# Patient Record
Sex: FEMALE | Race: WHITE | NOT HISPANIC OR LATINO | Employment: FULL TIME | URBAN - METROPOLITAN AREA
[De-identification: names, ages, dates, MRNs, and addresses within clinical notes are randomized per-mention and may not be internally consistent; named-entity substitution may affect disease eponyms.]

---

## 2019-09-30 ENCOUNTER — OFFICE VISIT (OUTPATIENT)
Dept: ORTHOPEDIC SURGERY | Facility: CLINIC | Age: 54
End: 2019-09-30

## 2019-09-30 VITALS — HEIGHT: 66 IN | BODY MASS INDEX: 34.55 KG/M2 | TEMPERATURE: 97.4 F | WEIGHT: 215 LBS

## 2019-09-30 DIAGNOSIS — R52 PAIN: ICD-10-CM

## 2019-09-30 DIAGNOSIS — M65.28 CALCIFIC ACHILLES TENDONITIS: Primary | ICD-10-CM

## 2019-09-30 DIAGNOSIS — M19.079 ARTHRITIS OF ANKLE: ICD-10-CM

## 2019-09-30 DIAGNOSIS — M92.61 HAGLUND'S DEFORMITY OF RIGHT HEEL: ICD-10-CM

## 2019-09-30 PROCEDURE — 99204 OFFICE O/P NEW MOD 45 MIN: CPT | Performed by: ORTHOPAEDIC SURGERY

## 2019-09-30 PROCEDURE — 73650 X-RAY EXAM OF HEEL: CPT | Performed by: ORTHOPAEDIC SURGERY

## 2019-09-30 RX ORDER — AMITRIPTYLINE HYDROCHLORIDE 50 MG/1
50 TABLET, FILM COATED ORAL DAILY
COMMUNITY
Start: 2019-01-30 | End: 2020-01-30

## 2019-09-30 RX ORDER — IRBESARTAN AND HYDROCHLOROTHIAZIDE 150; 12.5 MG/1; MG/1
1 TABLET, FILM COATED ORAL DAILY
COMMUNITY
Start: 2018-12-31

## 2019-09-30 NOTE — PROGRESS NOTES
New Patient Complaint      Patient: Lakeisha An  YOB: 1965 53 y.o. female  Medical Record Number: [id number]    Chief Complaints: My heel hurts    History of Present Illness:   Patient reports a 5 to 6-month history of moderate constant stabbing pain in the posterior portion of the plantar aspect of the right hindfoot began after she slipped down some steps at home.  Pain is worse on start up in the morning and after standing from prolonged seated position but never completely resolved.    Prior to that she had a history of bilateral ankle fractures in the past but no surgery has no specific complaints about the right ankle today but does complain of the ankle swells during the day and improved some with elevation overnight.  She has a tall boot from a previous injury but has not been using it.       HPI    Allergies:   Allergies   Allergen Reactions   • Sulfa Antibiotics Nausea And Vomiting       Medications:   Current Outpatient Medications on File Prior to Visit   Medication Sig   • amitriptyline (ELAVIL) 50 MG tablet Take 50 mg by mouth Daily.   • irbesartan-hydrochlorothiazide (AVALIDE) 150-12.5 MG tablet Take 1 tablet by mouth Daily.     No current facility-administered medications on file prior to visit.        Past Medical History:   Diagnosis Date   • Hypertension      Past Surgical History:   Procedure Laterality Date   • HYSTERECTOMY     • SINUS SURGERY       Social History     Occupational History   • Not on file   Tobacco Use   • Smoking status: Never Smoker   • Smokeless tobacco: Never Used   Substance and Sexual Activity   • Alcohol use: No     Frequency: Never   • Drug use: No   • Sexual activity: Not on file      Social History     Social History Narrative   • Not on file     History reviewed. No pertinent family history.    Review of Systems: 14 point review of systems performed, positive pertinent findings identified in HPI. All remaining systems negative except congestion,  "sinus pain, sinus pressure, sore throat    Review of Systems      Physical Exam:   Vitals:    09/30/19 0946   Temp: 97.4 °F (36.3 °C)   TempSrc: Temporal   Weight: 97.5 kg (215 lb)   Height: 167.6 cm (66\")   PainSc:   6   PainLoc: Foot     Physical Exam   Constitutional: pleasant, well developed   Eyes: sclera non icteric  Hearing : adequate for exam  Cardiovascular: palpable pulses in right foot, right calf/ thigh NT without sign of DVT  Respiratoy: breathing unlabored   Neurological: grossly sensate to LT throughout right LE  Psychiatric: oriented with normal mood and affect.   Lymphatic: No palpable popliteal lymphadenopathy right LE  Skin: intact throughout right leg/foot  Musculoskeletal: Right ankle showed no focal tenderness of the medial or lateral aspects of the ankle there was moderate tenderness palpation at the insertion of the Achilles and some pain with medial lateral calcaneal traction as well as at the insertion of the plantar fascia.  Neutral dorsiflexion of the heel cord.  There is no focal pain with tibiotalar range of motion  Physical Exam  Ortho Exam    Radiology: 2 views right calcaneus ordered evaluate pain reviewed and no prior x-rays available for comparison there is spurring at the insertion of the Achilles more so than plantarly no obvious fracture.  There does appear to be some mild arthritic change of the tibiotalar joint with some spurring posteriorly and anteriorly.  There is some questionable lucent area of the distal aspect of the tibia on the lateral view    Assessment/Plan: 1.  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  Asymptomatic right ankle arthritis status post nonoperative treatment for ankle fracture elsewhere    We reviewed treatment options and she can get back into the tall boot that she has and limit activity.  She declined use of a cane.    We had her fitted with a " night splint discussed etiology of its use and demonstrated gentle heel cord stretching exercises to do at least 3 or 4 times a day and she was fitted with compression hose for daytime use.    We will get an MRI of her right hindfoot to make sure there is no calcaneal stress fracture or interstitial tear of the Achilles is not that we can proceed with more aggressive therapy type exercises.    She understands to call to schedule follow-up appointment after MRI has been scheduled in order to review results in the office.    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

## 2019-10-03 ENCOUNTER — TELEPHONE (OUTPATIENT)
Dept: ORTHOPEDIC SURGERY | Facility: CLINIC | Age: 54
End: 2019-10-03

## 2019-10-03 NOTE — TELEPHONE ENCOUNTER
I spoke with patient and she evidently does not want to have the MRI as her insurance will not pay for it.    She not really been using the tall boot as she says is difficult to work in but has been using a night splint.    Reviewed with her that the main reason for the MRI which make sure is no stress fracture and if symptoms are improving we could hold off on it for now she will monitor this and let me know we will mail her some gentle stretching exercises to do and set up to see me back in a month but if anything worsens in the interim she will let me know and we will go ahead and see about getting the MRI.

## 2019-10-11 ENCOUNTER — TELEPHONE (OUTPATIENT)
Dept: ORTHOPEDIC SURGERY | Facility: CLINIC | Age: 54
End: 2019-10-11

## 2019-10-11 NOTE — TELEPHONE ENCOUNTER
Patient returned call regarding schdeuling an appt. In 1 mo with MWAIDEN. Patient says that she is doing a little better since she's wearing the boot at night and the stockings during the day also doing the exercises that were given. Patient will call back if problem gets worse.

## 2021-06-07 ENCOUNTER — TRANSCRIBE ORDERS (OUTPATIENT)
Dept: ADMINISTRATIVE | Facility: HOSPITAL | Age: 56
End: 2021-06-07

## 2021-06-07 ENCOUNTER — LAB (OUTPATIENT)
Dept: LAB | Facility: HOSPITAL | Age: 56
End: 2021-06-07

## 2021-06-07 DIAGNOSIS — R05.9 COUGH: ICD-10-CM

## 2021-06-07 DIAGNOSIS — J98.8 CONGESTION OF RESPIRATORY TRACT: ICD-10-CM

## 2021-06-07 DIAGNOSIS — R06.2 WHEEZING: ICD-10-CM

## 2021-06-07 DIAGNOSIS — R06.2 WHEEZING: Primary | ICD-10-CM

## 2021-06-07 LAB
B PARAPERT DNA SPEC QL NAA+PROBE: NOT DETECTED
B PERT DNA SPEC QL NAA+PROBE: NOT DETECTED
BASOPHILS # BLD AUTO: 0.02 10*3/MM3 (ref 0–0.2)
BASOPHILS NFR BLD AUTO: 0.2 % (ref 0–1.5)
C PNEUM DNA NPH QL NAA+NON-PROBE: NOT DETECTED
DEPRECATED RDW RBC AUTO: 42.5 FL (ref 37–54)
EOSINOPHIL # BLD AUTO: 0.21 10*3/MM3 (ref 0–0.4)
EOSINOPHIL NFR BLD AUTO: 2.5 % (ref 0.3–6.2)
ERYTHROCYTE [DISTWIDTH] IN BLOOD BY AUTOMATED COUNT: 13.2 % (ref 12.3–15.4)
FLUAV SUBTYP SPEC NAA+PROBE: NOT DETECTED
FLUBV RNA ISLT QL NAA+PROBE: NOT DETECTED
HADV DNA SPEC NAA+PROBE: NOT DETECTED
HCOV 229E RNA SPEC QL NAA+PROBE: NOT DETECTED
HCOV HKU1 RNA SPEC QL NAA+PROBE: NOT DETECTED
HCOV NL63 RNA SPEC QL NAA+PROBE: NOT DETECTED
HCOV OC43 RNA SPEC QL NAA+PROBE: NOT DETECTED
HCT VFR BLD AUTO: 42.7 % (ref 34–46.6)
HGB BLD-MCNC: 14.1 G/DL (ref 12–15.9)
HMPV RNA NPH QL NAA+NON-PROBE: NOT DETECTED
HPIV1 RNA SPEC QL NAA+PROBE: NOT DETECTED
HPIV2 RNA SPEC QL NAA+PROBE: NOT DETECTED
HPIV3 RNA NPH QL NAA+PROBE: NOT DETECTED
HPIV4 P GENE NPH QL NAA+PROBE: NOT DETECTED
IMM GRANULOCYTES # BLD AUTO: 0.03 10*3/MM3 (ref 0–0.05)
IMM GRANULOCYTES NFR BLD AUTO: 0.4 % (ref 0–0.5)
LYMPHOCYTES # BLD AUTO: 1.98 10*3/MM3 (ref 0.7–3.1)
LYMPHOCYTES NFR BLD AUTO: 23.7 % (ref 19.6–45.3)
M PNEUMO IGG SER IA-ACNC: NOT DETECTED
MCH RBC QN AUTO: 29 PG (ref 26.6–33)
MCHC RBC AUTO-ENTMCNC: 33 G/DL (ref 31.5–35.7)
MCV RBC AUTO: 87.7 FL (ref 79–97)
MONOCYTES # BLD AUTO: 0.68 10*3/MM3 (ref 0.1–0.9)
MONOCYTES NFR BLD AUTO: 8.1 % (ref 5–12)
NEUTROPHILS NFR BLD AUTO: 5.45 10*3/MM3 (ref 1.7–7)
NEUTROPHILS NFR BLD AUTO: 65.1 % (ref 42.7–76)
NRBC BLD AUTO-RTO: 0 /100 WBC (ref 0–0.2)
PLATELET # BLD AUTO: 216 10*3/MM3 (ref 140–450)
PMV BLD AUTO: 12.2 FL (ref 6–12)
PROCALCITONIN SERPL-MCNC: 0.05 NG/ML (ref 0–0.25)
RBC # BLD AUTO: 4.87 10*6/MM3 (ref 3.77–5.28)
RHINOVIRUS RNA SPEC NAA+PROBE: DETECTED
RSV RNA NPH QL NAA+NON-PROBE: NOT DETECTED
SARS-COV-2 RNA NPH QL NAA+NON-PROBE: NOT DETECTED
WBC # BLD AUTO: 8.37 10*3/MM3 (ref 3.4–10.8)

## 2021-06-07 PROCEDURE — 0202U NFCT DS 22 TRGT SARS-COV-2: CPT

## 2021-06-07 PROCEDURE — 85025 COMPLETE CBC W/AUTO DIFF WBC: CPT

## 2021-06-07 PROCEDURE — 84145 PROCALCITONIN (PCT): CPT

## 2021-06-07 PROCEDURE — 36415 COLL VENOUS BLD VENIPUNCTURE: CPT

## 2021-06-07 PROCEDURE — C9803 HOPD COVID-19 SPEC COLLECT: HCPCS

## 2021-09-04 ENCOUNTER — IMMUNIZATION (OUTPATIENT)
Dept: VACCINE CLINIC | Facility: HOSPITAL | Age: 56
End: 2021-09-04

## 2021-09-04 PROCEDURE — 91300 HC SARSCOV02 VAC 30MCG/0.3ML IM: CPT | Performed by: INTERNAL MEDICINE

## 2021-09-04 PROCEDURE — 0001A: CPT | Performed by: INTERNAL MEDICINE

## 2021-09-25 ENCOUNTER — IMMUNIZATION (OUTPATIENT)
Dept: VACCINE CLINIC | Facility: HOSPITAL | Age: 56
End: 2021-09-25

## 2021-09-25 PROCEDURE — 91300 HC SARSCOV02 VAC 30MCG/0.3ML IM: CPT | Performed by: INTERNAL MEDICINE

## 2021-09-25 PROCEDURE — 0002A: CPT | Performed by: INTERNAL MEDICINE

## 2021-10-29 ENCOUNTER — OFFICE VISIT (OUTPATIENT)
Dept: CARDIOLOGY | Facility: CLINIC | Age: 56
End: 2021-10-29

## 2021-10-29 VITALS
DIASTOLIC BLOOD PRESSURE: 82 MMHG | HEART RATE: 98 BPM | WEIGHT: 216.8 LBS | HEIGHT: 65 IN | BODY MASS INDEX: 36.12 KG/M2 | SYSTOLIC BLOOD PRESSURE: 132 MMHG | RESPIRATION RATE: 18 BRPM | OXYGEN SATURATION: 98 %

## 2021-10-29 DIAGNOSIS — R07.9 CHEST PAIN WITH MODERATE RISK FOR CARDIAC ETIOLOGY: Primary | ICD-10-CM

## 2021-10-29 DIAGNOSIS — I10 ESSENTIAL HYPERTENSION: ICD-10-CM

## 2021-10-29 DIAGNOSIS — E78.00 HYPERCHOLESTEROLEMIA: ICD-10-CM

## 2021-10-29 DIAGNOSIS — E66.01 SEVERE OBESITY (BMI 35.0-39.9) WITH COMORBIDITY (HCC): ICD-10-CM

## 2021-10-29 DIAGNOSIS — R00.2 PALPITATIONS: ICD-10-CM

## 2021-10-29 PROCEDURE — 93000 ELECTROCARDIOGRAM COMPLETE: CPT | Performed by: INTERNAL MEDICINE

## 2021-10-29 PROCEDURE — 99204 OFFICE O/P NEW MOD 45 MIN: CPT | Performed by: INTERNAL MEDICINE

## 2021-10-29 RX ORDER — CITALOPRAM 20 MG/1
TABLET ORAL
COMMUNITY
Start: 2021-09-13

## 2021-10-29 NOTE — PROGRESS NOTES
PATIENTINFORMATION    Date of Office Visit: 10/29/2021  Encounter Provider: Kilo Villalobos MD  Place of Service: [facility] CARDIOLOGY  Patient Name: Lakeisha An  : 1965    Subjective:     Encounter Date:10/29/2021      Patient ID: Lakeisha An is a 55 y.o. female.    No chief complaint on file.    HPI  Ms. An is a 55 years old female patient with past medical history of hypertension and hypercholesterolemia and prior history of upper extremity DVT related to PICC line referred to cardiac clinic for evaluation of chest pain.  She reports intermittent chest pain of several months duration which seems to have worsened recently and localized to the left anterior chest in the retrosternal area.  She get chest pain under different circumstances.  She typically get postcoital severe squeezing pain that can last for up to 15 minutes and are become more frequent.  Other times she may be standing or walking when she gets left anterior chest discomfort that radiates to the left jaw and left arm associated with some shortness of breath.  She may not get chest pain every day.  She also gets isolated palpitations that  she describes as heart racing fast and lasting for few minutes and occur several times a day with no known exacerbating or relieving factors.  She denies any presyncope or syncope.  No prior cardiovascular testing or procedures.  She admits she does not exercise regularly.  She reports family history of heart disease with both parents and older brother having coronary artery disease.  She denies any current tobacco use, recreational drug use or alcohol abuse    ROS   All systems reviewed and negative except as noted in HPI.    Past Medical History:   Diagnosis Date   • Depression    • DVT (deep venous thrombosis) (HCC)    • Fatigue    • HDL deficiency    • Hyperlipidemia    • Hypertension        Past Surgical History:   Procedure Laterality Date   • HYSTERECTOMY    "  • SINUS SURGERY         Social History     Socioeconomic History   • Marital status:    Tobacco Use   • Smoking status: Never Smoker   • Smokeless tobacco: Never Used   Substance and Sexual Activity   • Alcohol use: No     Comment: Caffiene: coffee 2 -3 cups daily   • Drug use: No   • Sexual activity: Yes     Partners: Male     Birth control/protection: Surgical       Family History   Problem Relation Age of Onset   • Hypertension Mother    • Allergies Father    • Hypertension Father    • Sinusitis Father            ECG 12 Lead    Date/Time: 10/29/2021 3:17 PM  Performed by: Kilo Villalobos MD  Authorized by: Kilo Villalobos MD   Comparison: compared with previous ECG   Rhythm: sinus rhythm  Rate: normal  Conduction: conduction normal  ST Segments: ST segments normal  T Waves: T waves normal  QRS axis: normal  Other: no other findings    Clinical impression: normal ECG               Objective:     /82 (BP Location: Left arm)   Pulse 98   Resp 18   Ht 165.1 cm (65\")   Wt 98.3 kg (216 lb 12.8 oz)   SpO2 98%   BMI 36.08 kg/m²  Body mass index is 36.08 kg/m².     Constitutional:       General: Not in acute distress.     Appearance: Well-developed. Not diaphoretic.   Eyes:      Pupils: Pupils are equal, round, and reactive to light.   HENT:      Head: Normocephalic and atraumatic.   Neck:      Thyroid: No thyromegaly.   Pulmonary:      Effort: Pulmonary effort is normal. No respiratory distress.      Breath sounds: Normal breath sounds. No wheezing. No rales.   Chest:      Chest wall: Not tender to palpatation.   Cardiovascular:      Normal rate. Regular rhythm.      No gallop.   Pulses:     Intact distal pulses.   Edema:     Peripheral edema absent.   Abdominal:      General: Bowel sounds are normal. There is no distension.      Palpations: Abdomen is soft.      Tenderness: There is no guarding.   Musculoskeletal: Normal range of motion.         General: No deformity.      Cervical back: " Normal range of motion and neck supple. Skin:     General: Skin is warm and dry.      Findings: No rash.   Neurological:      Mental Status: Alert and oriented to person, place, and time.      Cranial Nerves: No cranial nerve deficit.      Deep Tendon Reflexes: Reflexes are normal and symmetric.   Psychiatric:         Judgment: Judgment normal.         Review Of Data:    1 yr ago Resulting Agency   Triglycerides   0 - 149 mg/dL 201 High   [facility]   Cholesterol   0 - 199 mg/dL 272 High   [facility]   HDL Cholesterol   >49 mg/dL 47 Low   [facility]   LDL Cholesterol-Calculated   0 - 100 mg/dL 185 High             Assessment/Plan:       Ms. Pacheco is a 55 years old female patient referred to cardiology clinic for evaluation of chest pain and palpitations.    1.  Chest pain with qualities concerning for angina and positive risk factors for CAD including family history of CAD, hypercholesterolemia and hypertension  2.  Palpitations-rule out any significant arrhythmia  3.  Hypertension-fairly controlled on irbesartan 150 and hydrochlorothiazide 25 mg p.o. daily  4.  Hypercholesterolemia on atorvastatin 20 mg p.o. daily- most recent lipid panel still uncontrolled.  5.  Morbid obesity with body mass index of 36 and comorbidities including hypertension.  No diagnosis of sleep apnea.        Diagnosis and plan of care discussed with patient and verbalized understanding.           Kilo Villalobos MD  10/29/21  16:08 EDT

## 2021-11-04 ENCOUNTER — HOSPITAL ENCOUNTER (OUTPATIENT)
Dept: CARDIOLOGY | Facility: HOSPITAL | Age: 56
Discharge: HOME | End: 2021-11-04
Admitting: INTERNAL MEDICINE

## 2021-11-04 VITALS — HEIGHT: 65 IN | WEIGHT: 216.71 LBS | BODY MASS INDEX: 36.11 KG/M2

## 2021-11-04 DIAGNOSIS — R07.9 CHEST PAIN WITH MODERATE RISK FOR CARDIAC ETIOLOGY: ICD-10-CM

## 2021-11-04 LAB
BH CV NUCLEAR PRIOR STUDY: 2
BH CV STRESS BP STAGE 1: NORMAL
BH CV STRESS BP STAGE 2: NORMAL
BH CV STRESS DURATION MIN STAGE 1: 3
BH CV STRESS DURATION MIN STAGE 2: 3
BH CV STRESS DURATION SEC STAGE 1: 0
BH CV STRESS DURATION SEC STAGE 2: 0
BH CV STRESS GRADE STAGE 1: 10
BH CV STRESS GRADE STAGE 2: 12
BH CV STRESS HR STAGE 1: 119
BH CV STRESS HR STAGE 2: 152
BH CV STRESS METS STAGE 1: 5
BH CV STRESS METS STAGE 2: 7.5
BH CV STRESS NUCLEAR ISOTOPE DOSE: 38.3 MCI
BH CV STRESS PROTOCOL 1: NORMAL
BH CV STRESS RECOVERY BP: NORMAL MMHG
BH CV STRESS RECOVERY HR: 83 BPM
BH CV STRESS SPEED STAGE 1: 1.7
BH CV STRESS SPEED STAGE 2: 2.5
BH CV STRESS STAGE 1: 1
BH CV STRESS STAGE 2: 2
LV EF NUC BP: 59 %
MAXIMAL PREDICTED HEART RATE: 167 BPM
PERCENT MAX PREDICTED HR: 91.02 %
STRESS BASELINE BP: NORMAL MMHG
STRESS BASELINE HR: 72 BPM
STRESS PERCENT HR: 107 %
STRESS POST ESTIMATED WORKLOAD: 7.5 METS
STRESS POST EXERCISE DUR MIN: 6 MIN
STRESS POST EXERCISE DUR SEC: 0 SEC
STRESS POST PEAK BP: NORMAL MMHG
STRESS POST PEAK HR: 152 BPM
STRESS TARGET HR: 142 BPM

## 2021-11-04 PROCEDURE — 78452 HT MUSCLE IMAGE SPECT MULT: CPT

## 2021-11-04 PROCEDURE — 93016 CV STRESS TEST SUPVJ ONLY: CPT | Performed by: INTERNAL MEDICINE

## 2021-11-04 PROCEDURE — 93017 CV STRESS TEST TRACING ONLY: CPT

## 2021-11-04 PROCEDURE — 0 TECHNETIUM TETROFOSMIN KIT: Performed by: INTERNAL MEDICINE

## 2021-11-04 PROCEDURE — A9502 TC99M TETROFOSMIN: HCPCS | Performed by: INTERNAL MEDICINE

## 2021-11-04 PROCEDURE — 78451 HT MUSCLE IMAGE SPECT SING: CPT

## 2021-11-04 PROCEDURE — 78451 HT MUSCLE IMAGE SPECT SING: CPT | Performed by: INTERNAL MEDICINE

## 2021-11-04 PROCEDURE — 93018 CV STRESS TEST I&R ONLY: CPT | Performed by: INTERNAL MEDICINE

## 2021-11-04 RX ADMIN — TETROFOSMIN 1 DOSE: 1.38 INJECTION, POWDER, LYOPHILIZED, FOR SOLUTION INTRAVENOUS at 08:01

## 2021-11-04 NOTE — PROGRESS NOTES
Please make patient aware that stress test is normal !   Her cholesterol levels are still abnormal.. Can you check if she is still taking statin and if she is still taking statin advise to double up dose of atorvastatin to 40 mg daily and update med list afterwards, let me know if she has any questions.Thank you

## 2021-11-05 DIAGNOSIS — E78.00 HYPERCHOLESTEROLEMIA: Primary | ICD-10-CM

## 2021-11-05 RX ORDER — ATORVASTATIN CALCIUM 20 MG/1
40 TABLET, FILM COATED ORAL DAILY
Qty: 90 TABLET | Refills: 3 | Status: SHIPPED | OUTPATIENT
Start: 2021-11-05

## 2021-11-05 RX ORDER — ATORVASTATIN CALCIUM 40 MG/1
40 TABLET, FILM COATED ORAL DAILY
COMMUNITY
End: 2021-11-05 | Stop reason: SDUPTHER

## 2021-11-08 ENCOUNTER — TELEPHONE (OUTPATIENT)
Dept: CARDIOLOGY | Facility: CLINIC | Age: 56
End: 2021-11-08

## 2021-11-08 NOTE — TELEPHONE ENCOUNTER
TRIAGE RN--- please let patient know that heart monitor study was normal.  There were 6 times she reported palpitations and they did not correlate with any arrhythmias or premature beats.    Would check blood pressure as she is having symptoms to ensure not related to high or low readings.  Would avoid stimulants as dehydrated.    Would keep February follow-up with Dr. Villalobos as scheduled or call sooner for issues or concerns.

## 2021-11-08 NOTE — TELEPHONE ENCOUNTER
Patient is calling requesting to speak to Y and go over the results from her recent Holter Monitor.

## 2021-11-23 ENCOUNTER — TELEPHONE (OUTPATIENT)
Dept: CARDIOLOGY | Facility: CLINIC | Age: 56
End: 2021-11-23

## 2021-11-23 NOTE — TELEPHONE ENCOUNTER
----- Message from Kilo Villalobos MD sent at 11/5/2021  5:13 PM EDT -----  Holter results are normal  ----- Message -----  From: Saturnino Chin RN  Sent: 11/5/2021   2:26 PM EDT  To: Kilo Villalobos MD    Spoke with pt about stress test results, she verbalizes understanding.  She is taking atorvastatin, I told her to double her dose and take 40mg daily, she verbalizes understanding.  She does want to know the results of her holter monitor ASAP.    Thanks

## 2021-11-29 ENCOUNTER — TELEPHONE (OUTPATIENT)
Dept: CARDIOLOGY | Facility: CLINIC | Age: 56
End: 2021-11-29